# Patient Record
Sex: FEMALE | Race: WHITE | NOT HISPANIC OR LATINO | Employment: FULL TIME | URBAN - METROPOLITAN AREA
[De-identification: names, ages, dates, MRNs, and addresses within clinical notes are randomized per-mention and may not be internally consistent; named-entity substitution may affect disease eponyms.]

---

## 2019-12-27 ENCOUNTER — HOSPITAL ENCOUNTER (EMERGENCY)
Facility: MEDICAL CENTER | Age: 44
End: 2019-12-27
Attending: EMERGENCY MEDICINE
Payer: COMMERCIAL

## 2019-12-27 VITALS
SYSTOLIC BLOOD PRESSURE: 131 MMHG | WEIGHT: 212.52 LBS | RESPIRATION RATE: 16 BRPM | OXYGEN SATURATION: 99 % | DIASTOLIC BLOOD PRESSURE: 82 MMHG | TEMPERATURE: 97.5 F | BODY MASS INDEX: 34.16 KG/M2 | HEART RATE: 79 BPM | HEIGHT: 66 IN

## 2019-12-27 DIAGNOSIS — T78.40XA ALLERGIC REACTION, INITIAL ENCOUNTER: ICD-10-CM

## 2019-12-27 PROCEDURE — A9270 NON-COVERED ITEM OR SERVICE: HCPCS | Performed by: EMERGENCY MEDICINE

## 2019-12-27 PROCEDURE — 99283 EMERGENCY DEPT VISIT LOW MDM: CPT

## 2019-12-27 PROCEDURE — 700102 HCHG RX REV CODE 250 W/ 637 OVERRIDE(OP): Performed by: EMERGENCY MEDICINE

## 2019-12-27 RX ORDER — MONTELUKAST SODIUM 10 MG/1
10 TABLET ORAL DAILY
COMMUNITY

## 2019-12-27 RX ORDER — DIPHENHYDRAMINE HCL 25 MG
50 TABLET ORAL ONCE
Status: COMPLETED | OUTPATIENT
Start: 2019-12-27 | End: 2019-12-27

## 2019-12-27 RX ORDER — PREDNISONE 20 MG/1
40 TABLET ORAL DAILY
Qty: 10 TAB | Refills: 0 | Status: SHIPPED | OUTPATIENT
Start: 2019-12-27 | End: 2020-01-01

## 2019-12-27 RX ADMIN — DIPHENHYDRAMINE HYDROCHLORIDE 50 MG: 25 TABLET ORAL at 09:49

## 2019-12-27 ASSESSMENT — LIFESTYLE VARIABLES
EVER FELT BAD OR GUILTY ABOUT YOUR DRINKING: NO
TOTAL SCORE: 0
HAVE PEOPLE ANNOYED YOU BY CRITICIZING YOUR DRINKING: NO
TOTAL SCORE: 0
TOTAL SCORE: 0
HAVE YOU EVER FELT YOU SHOULD CUT DOWN ON YOUR DRINKING: NO
EVER HAD A DRINK FIRST THING IN THE MORNING TO STEADY YOUR NERVES TO GET RID OF A HANGOVER: NO
DO YOU DRINK ALCOHOL: NO
CONSUMPTION TOTAL: INCOMPLETE

## 2019-12-27 NOTE — ED PROVIDER NOTES
ED Provider Note    Scribed for Ben Francisco M.D. by Deisi Gandara. 12/27/2019, 9:08 AM.    Primary care provider: None  Means of arrival: Walk in  History obtained from: Patient  History limited by: None    CHIEF COMPLAINT  •  Allergic Reaction    HPI  Radha Avila is a 44 y.o. female who presents to the Emergency Department for an allergic reaction with an onset of 1 day. The patient reportedly had pesto containing pine nuts for dinner yesterday at a casino restaurant. Shortly after, she began to feel irritation to her throat. Mild improvement after using her inhaler but did not take Benadryl. She woke up this morning with increased irritation and pharyngeal edema described as feeling her throat closing. She has experienced multiple loose bowel movements this morning. Negative for rash, drooling, tongue swelling, facial swelling, shortness of breath, abdominal pain or vomiting. She has been evaluated by an allergist in the past who did not note any food allergies on a skin test.       REVIEW OF SYSTEMS  As above otherwise all other systems are negative. See [facility] for further details.      PAST MEDICAL HISTORY  Patient has a past medical history of Asthma.      SURGICAL HISTORY  Patient has a past surgical history that includes hysterectomy laparoscopy.      SOCIAL HISTORY  Social History     Tobacco Use   • Smoking status: Never Smoker   • Smokeless tobacco: Never Used   Substance Use Topics   • Alcohol use: Yes     Comment: occ   • Drug use: Never      Social History     Substance and Sexual Activity   Drug Use Never     Patient is accompanied by her spouse. They are visiting the area and reside in [place].      FAMILY HISTORY  History reviewed. No pertinent family history.      CURRENT MEDICATIONS  •  montelukast (SINGULAIR) 10 MG Tab, Take 10 mg by mouth every day., Disp: , Rfl:      Patient denies taking Benadryl.    ALLERGIES  Allergies   Allergen Reactions   • Bactrim [Sulfamethoxazole  "W-Trimethoprim]    • Clindamycin    • Darvocet [Propoxyphene N-Apap]    • Ibuprofen    • Pcn [Penicillins] Rash     .   • Sumatriptan Succinate    • Tylenol        PHYSICAL EXAM  VITAL SIGNS: /91   Pulse 71   Temp 36.4 °C (97.5 °F) (Temporal)   Resp 18   Ht 1.676 m (5' 6\")   Wt 96.4 kg (212 lb 8.4 oz)   SpO2 99%   Breastfeeding? No   BMI 34.30 kg/m²       Constitutional: Well developed, Well nourished, No acute distress, Non-toxic appearance.   HENT: Normocephalic, Atraumatic, Bilateral external ears normal, oropharynx moist, No oral exudates, Nose normal.   Eyes: There are no signs of exudate.   Neck: Supple, no meningeal signs. No stridor. No hypophonation.   Lymphatic: No lymphadenopathy noted.   Cardiovascular: Regular rate and rhythm without murmurs gallops or rubs.   Thorax & Lungs: No respiratory distress. Breathing comfortably. Lungs are clear to auscultation bilaterally, there are no wheezes no rales. Chest wall is nontender.  Abdomen: Soft, nontender, nondistended. Bowel sounds are present.   Skin: Warm, Dry, No erythema,   Back: No tenderness, No CVA tenderness.  Neurologic: Alert & oriented x 3, Moving all extremities. No gross abnormalities.    Psychiatric: Affect normal, Judgment normal, Mood normal.       COURSE & MEDICAL DECISION MAKING  Pertinent Labs & Imaging studies reviewed. (See chart for details)    Differential Diagnoses include but are not limited to: allergic reaction.    9:08 AM Obtained and reviewed patient's electronic medical records which indicate no prior ED visits.    9:10 AM Patient seen and examined at bedside for an allergic reaction. Initial treatment in the Emergency Department included Benadryl 50 mg PO.     Discharge plan was discussed with the patient and includes following up with her PCP. Patient will be discharged with a prescription for Prednisone. Recommended the patient take Benadryl for her symptoms.       Return to the ED for new or persisting symptoms " "including pharyngeal edema, facial swelling, shortness of breath or any additional concerns. The patient verbalized her understanding and will comply. Patient is stable at the time of discharge.  Vital signs were reviewed: /91   Pulse 71   Temp 36.4 °C (97.5 °F) (Temporal)   Resp 18   Ht 1.676 m (5' 6\")   Wt 96.4 kg (212 lb 8.4 oz)   SpO2 99%   Breastfeeding? No   BMI 34.30 kg/m²        Decision Making:  The patient presents for an allergic reaction after eating pine nuts present in pesto sauce. Prior evaluation via skin test with allergist did not indicate any food allergies. On exam, there is no concern for airway involvement. She was treated with Benadryl and prescribed Prednisone. Recommended she continue Benadryl at home. Strict return precautions were provided.       DISPOSITION  Patient will be discharged home in stable condition.      FOLLOW UP  Your PCP    The patient is referred to a primary physician for blood pressure management, diabetic screening, and for all other preventative health concerns.      OUTPATIENT MEDICATIONS  Discharge Medication List as of 12/27/2019  9:46 AM      START taking these medications    Details   predniSONE (DELTASONE) 20 MG Tab Take 2 Tabs by mouth every day for 5 days., Disp-10 Tab, R-0, Print Rx Paper             DIAGNOSIS  1. Allergic reaction, initial encounter           Deisi MASON (Senaitibe), am scribing for, and in the presence of, Ben Francisco M.D.    Electronically signed by: Deisi Gandara (Rhona), 12/27/2019    Ben MASON M.D. personally performed the services described in this documentation, as scribed by Deisi Gandara in my presence, and it is both accurate and complete. C.    The note accurately reflects work and decisions made by me.  Ben Francisco  12/27/2019  2:45 PM     "

## 2019-12-27 NOTE — ED TRIAGE NOTES
"Chief Complaint   Patient presents with   • Allergic Reaction     Pt reports possible allergic reaction to pine nuts she ate yesterday. C/o throat closing and like she can't breath. NAD. Speaking in full sentences. Pt able to handle own secretions. No hives, N/V.      /91   Pulse 71   Temp 36.4 °C (97.5 °F) (Temporal)   Resp 18   Ht 1.676 m (5' 6\")   Wt 96.4 kg (212 lb 8.4 oz)   SpO2 99%   Breastfeeding? No   BMI 34.30 kg/m²     Pt ambulated into triage, pt took Singulair last night without relief. VS as above, NAD, encouraged to return to the triage nurse or tech with any new complaints or symptoms.  "